# Patient Record
Sex: FEMALE | Race: WHITE | URBAN - METROPOLITAN AREA
[De-identification: names, ages, dates, MRNs, and addresses within clinical notes are randomized per-mention and may not be internally consistent; named-entity substitution may affect disease eponyms.]

---

## 2017-01-11 ENCOUNTER — TRANSFERRED RECORDS (OUTPATIENT)
Dept: HEALTH INFORMATION MANAGEMENT | Facility: CLINIC | Age: 82
End: 2017-01-11

## 2017-01-24 ENCOUNTER — TRANSFERRED RECORDS (OUTPATIENT)
Dept: HEALTH INFORMATION MANAGEMENT | Facility: CLINIC | Age: 82
End: 2017-01-24

## 2017-02-06 ENCOUNTER — OFFICE VISIT (OUTPATIENT)
Dept: NEUROSURGERY | Facility: CLINIC | Age: 82
End: 2017-02-06

## 2017-02-06 VITALS — SYSTOLIC BLOOD PRESSURE: 134 MMHG | HEART RATE: 112 BPM | HEIGHT: 67 IN | DIASTOLIC BLOOD PRESSURE: 89 MMHG

## 2017-02-06 DIAGNOSIS — M54.16 RIGHT LUMBAR RADICULOPATHY: ICD-10-CM

## 2017-02-06 DIAGNOSIS — M43.10 ACQUIRED SPONDYLOLISTHESIS: ICD-10-CM

## 2017-02-06 RX ORDER — FENTANYL 12.5 UG/1
1 PATCH TRANSDERMAL
COMMUNITY

## 2017-02-06 RX ORDER — TIMOLOL MALEATE 2.5 MG/ML
1 SOLUTION/ DROPS OPHTHALMIC 2 TIMES DAILY
COMMUNITY

## 2017-02-06 RX ORDER — HYDROCODONE BITARTRATE AND ACETAMINOPHEN 5; 325 MG/1; MG/1
1 TABLET ORAL EVERY 6 HOURS PRN
COMMUNITY

## 2017-02-06 ASSESSMENT — ENCOUNTER SYMPTOMS
HEMOPTYSIS: 0
SPEECH CHANGE: 0
DIZZINESS: 1
MYALGIAS: 1
HALLUCINATIONS: 0
HEARTBURN: 1
LEG PAIN: 1
TACHYCARDIA: 0
NERVOUS/ANXIOUS: 1
CHILLS: 0
HEMATURIA: 0
SEIZURES: 0
EYE PAIN: 0
SHORTNESS OF BREATH: 1
INSOMNIA: 1
POLYPHAGIA: 0
COUGH: 1
LEG SWELLING: 0
DYSPNEA ON EXERTION: 1
DECREASED CONCENTRATION: 1
BACK PAIN: 1
LIGHT-HEADEDNESS: 0
DOUBLE VISION: 0
NIGHT SWEATS: 0
SLEEP DISTURBANCES DUE TO BREATHING: 1
DECREASED APPETITE: 1
RESPIRATORY PAIN: 0
POSTURAL DYSPNEA: 1
PALPITATIONS: 0
CLAUDICATION: 0
MUSCLE WEAKNESS: 1
ARTHRALGIAS: 1
WEIGHT GAIN: 0
MEMORY LOSS: 0
SPUTUM PRODUCTION: 0
ALTERED TEMPERATURE REGULATION: 1
NAUSEA: 1
NUMBNESS: 1
DEPRESSION: 1
NECK PAIN: 1
BLOATING: 0
WEAKNESS: 1
EYE WATERING: 0
HYPOTENSION: 1
SNORES LOUDLY: 0
TREMORS: 0
COUGH DISTURBING SLEEP: 0
FEVER: 0
JAUNDICE: 0
EXERCISE INTOLERANCE: 1
SYNCOPE: 0
HYPERTENSION: 0
DISTURBANCES IN COORDINATION: 1
JOINT SWELLING: 1
VOMITING: 0
WEIGHT LOSS: 0
MUSCLE CRAMPS: 1
DYSURIA: 0
BOWEL INCONTINENCE: 0
HEADACHES: 0
EYE REDNESS: 0
DIFFICULTY URINATING: 0
RECTAL PAIN: 0
POLYDIPSIA: 0
CONSTIPATION: 0
TINGLING: 0
DIARRHEA: 0
PARALYSIS: 0
FATIGUE: 1
FLANK PAIN: 0
WHEEZING: 0
LOSS OF CONSCIOUSNESS: 0
ABDOMINAL PAIN: 0
INCREASED ENERGY: 1
BLOOD IN STOOL: 0
ORTHOPNEA: 1
RECTAL BLEEDING: 0
PANIC: 1
STIFFNESS: 1

## 2017-02-06 ASSESSMENT — PAIN SCALES - GENERAL: PAINLEVEL: NO PAIN (0)

## 2017-02-06 NOTE — PROGRESS NOTES
Neurosurgery Clinic Consult  Date of Visit: 2/6/2017    REFERRING PHYSICIAN:  Estevan Campbell MD, Family Medicine.      CHIEF COMPLAINT:  Low back pain, right leg pain.      HISTORY OF PRESENT ILLNESS:  Ms. Erickson is a pleasant 81-year-old white female who has been dealing with low back pain for a number of years.  On initial presentation to our clinic today there was a bit of confusion about her records but we located them and the appointment proceeded reasonably well after that, though her son who accompanies her seemed to remain agitated by this.  She's had back pain for years but relates that over the last 6 months she developed an acute right leg pain in conjunction with that.  Initially it started down the outside of the right leg, and lately it has been circumferentially around the leg.  The pain is worse if she is doing anything active, standing or walking.  Sitting helps, lying down seems to help, but she also has pain, sometimes worse at night where she will wake up with a significant right leg pain.  She notes the bottom of her right foot is numb, and it seems as though the right leg is weak.  She thinks the hip gives out, but she is not really sure.  She has had a little trouble with her bladder lately, but she is not sure if that is really related to anything other than the fact that she is having to take medications.  She has seen a number of physicians and has had MRIs of her lumbar spine, her pelvis and hips and has seen an orthopedic surgeon who felt that she did not have much in the way of trouble in the joint itself, but she does have bilateral osteoarthritis, probable trochanteric bursal pain.  She has had physical therapy, a right trochanteric bursal injection, which the patient is quite insistent made no difference, not even in the anesthetic phase.  She had a right L4-5 injection, presumably transforaminal, but I did not see it on the notes specifically how was injected.  She also had a  right L5-S1, again presumably transforaminal injection.  The patient is also insistent that neither of those injections helped, not even for an hour.  This is a bit problematic because these do seem to be pain generators that are identified both on imaging and on exam.      She was also found to have torn labrum bilaterally, significant tendinitis involving gluteus and piriformis bilaterally.  She is not feeling any better with any of the maneuvers that she has undergone and so was referred directly to Dr. Reyes for further evaluation and care.  She is from about 7 hours drive distance, and since she was referred directly to Dr Reyes did not inquire if there were any closer neurosurgical providers closer to her home.      Her  is in the hospital, undergoing cancer treatments, and she is anxious to start feeling better herself so that she can be available for him to help him through his difficulties.      PAST MEDICAL HISTORY, FAMILY HISTORY, SOCIAL HISTORY:  All reviewed in [facility].   13 point ROS:  Reviewed in Epic and negative except for symptoms associated with PMH and HPI.  MEDICATIONS AND ALLERGIES:  Reviewed in Epic.      IMAGING:    Open MRI in [place] on 11/22/2016 performed MRI of the right hip and pelvis revealing bilateral labral tears, bilateral SI joint osteoarthritis, no occult fractures, bilateral gluteal insertion tendinoplasties and right greater than left trochanteric bursitis.    MRI lumbar [facility] in [place], [place] on 12/13/2016 without gadolinium showed multilevel degenerative disk and facet disease, most severe at L4-5 and L5-S1.  She has a listhesis at L4-5, vacuum disk phenomenon at both of those levels, severe loss of disk space height, moderate central canal narrowing at L4-5, severe right and moderate left neural foraminal narrowing at L5-S1.      PHYSICAL EXAMINATION:  Pertinent positives are tenderness over the bilateral SI joints, right much greater than  left; tenderness over the bilateral trochanteric bursa, right much greater than left; 5/5 strength in the extremities with the exception of anterior tibialis, EHL, inversion and eversion of the right foot.     Well developed, well nourished lady found seated comfortably in exam room chair.     She is able to sit and rise independently.  She is accompanied by her son.    *  Mental Status  A&O X3.  Bright, alert, affable, interactive. Language fluid, fund of knowledge intact. Good historian.  Mood and affect congruent and WNL.   *  Cranial Nerves  II: Able to read printed forms, VF full to gross confrontation.  III: IV, VI:  PERRLA, EOMI, No nystagmus, no ptosis.  V: Sensation intact in bilateral V1, V2, and V3. Jaw clench symmetric.    VII:  Intact to voice bilaterally.  IX:  Pushes tongue against bilateral cheeks.  X:  Palate elevates, uvula midline, phonation intact.  XI: Elevates shoulders, head turn intact.  XII: Tongue midline. No fasciculations.  *   Finger nose accurate. No drift.  *  Cervical Spine:  DTR's at Biceps, Triceps, and Brachioradialis 2/4 and symmetric.  Sensation intact.    No Fernández's. No Phalen's.  No Tinel's. No fasciculations.   Muscle bulk and tone WNL.    *  Lumbar Spine:    DTR's Patellar and Achilles 1/4 and symmetric.   No fasciculations.  Muscle bulk and tone WNL.  No Clonus.  Sensation intact.    Lower Extremity Strength                   RIGHT                   LEFT     Iliopsoas                    5/5                      5/5       Quad                    5/5                        5/5       Hamstring                      5/5                        5/5         Gastrocs                    5/5                        5/5       Tib. Anterior                     4/5                        5/5       EHL                      4/5                        5/5       Babinski                 Down                                      Down                     *  Structural Exam  Inspection of the  spine reveals no scoliosis. Head is forward of the pelvis in the sagittal plane.     Lumbar ROM tight. No spasming, no tenderness, no step offs. No SLR.  .  Feet are warm.  Gait narrow, no scissoring.  Uses a walker.   right antalgia.      ASSESSMENT:  The primary encounter diagnosis was Lumbago. Diagnoses of Acquired spondylolisthesis and Right lumbar radiculopathy were also pertinent to this visit.     IMPRESSION/PLAN  Ms. Erickson almost certainly has a lumbar radiculopathy, probably not spinal stenosis symptoms so much, but is coupled with degenerative joint disease at a number of other locations and so finding solution for her pain is going to be a bit of a challenge.      I do feel that she might be moving on dynamic images, so we will get some flexion/extension views.   We will get a CT scan of the lumbar spine to better delineate the bony anatomy and I urged her that she should continue on with meeting with her pain management doctor, regular injections, in an attempt to alleviate the sources of pain that are outside of the lumbar spine.  Essentially the idea is to pick away at each pain generator in a series so that overall she will start to feel better. However, given that, I have not lost sight of the fact that her right ankle is weak, and that is almost certainly because of the stenosis we see on the imaging.      Once I have the new images, I will review them with Dr. Reyes and determine if he would see her initially or refer her to one of our complex spine specialist here.  Because she lives so far away, we want to make sure that she only has come once more for evaluation and then a surgical decision.    While she is happy to be here today and to be seen, she is also interested perhaps in pursuing a neurosurgical evaluation significantly closer to home and would consider [place] as a reasonable location to get care.      Certainly, we will be more than happy to do whatever is in the best interest of this  very nice lady.      We appreciate the opportunity to participate in the care of this pleasant patient.  We appreciate your confidence in referring her to the [facility] Department of Neurosurgery.      Best Regards,    Rosa Todd PA-C  [facility]  Department of Neurosurgery  Phone: [phone]  Fax: [phone]        Total time: 60 minutes with more than 30 minutes spent in direct face to face contact reviewing films, providing education, counseling, the importance of good health habits, non-operative therapies,and indications for surgery as well as further follow up.

## 2017-02-06 NOTE — Clinical Note
2/6/2017       RE: Nadege Boothe [place]  [address] [address]     Dear Colleague,    Thank you for referring your patient, Nadege Erickson, to the [facility] NEUROSURGERY at [facility]. Please see a copy of my visit note below.        DRAFT   dictated    Neurosurgery Clinic Consult  Date of Visit: 2/6/2017    Referring Provider:  Referred for:     Dear      We were happy to see Nadege Erickson , a pleasant 81 year old year old * handed female for    HPI: His/Her history begins .    Treatment history:  SURGERIES:   PT:   TRACTION:   ORAL STEROIDS:   CHIROPRACTIC:  INJECTIONS:   CHEMO:   XRT:   PERTINENT MEDS:   NICOTINE:   PAIN CONTRACT:     Symptom description:  Reviewed in Epic;  Patient Supplied Answers To the [facility] Pain Questionnaire  [facility] Pain -  Patient Entered Questionnaire/Answers 2/6/2017   What number best describes your pain right now:  0 = No pain  to  10 = Worst pain imaginable 3   How would you describe the pain? sharp   Which of the following worsen your pain? standing, walking, exercise   Which of the following improve or reduce your pain?  sitting, medication   What number best describes your average pain for the past week:  0 = No pain  to  10 = Worst pain imaginable 7   What number best describes your LOWEST pain in past 24 hours:  0 = No pain  to  10 = Worst pain imaginable 2   What number best describes your WORST pain in past 24 hours:  0 = No pain  to  10 = Worst pain imaginable 10   When is your pain worst? Night   What non-medicine treatments have you already had for your pain? pain clinic, physical therapy, spine injections (shots)   Have you tried treating your pain with medication?  Yes   Are you currently taking medications for your pain? Yes         Other Symptoms:  *  NUMBNESS:           WHERE:          QUALITY:    Burning, tingling, cold, crawling, hypersensitive, pins and needles.    *  WEAKNESS:    *  BOWEL/BLADDER FUNCTION:  Intact.     *  OTHER SYMPTOMS:  Coordination, Balance, Vision, Hearing, Scent, Speech.    SHe presents for evaluation, and treatment recommendations with the understanding that we are a surgical team.      Current outpatient prescriptions:      HYDROcodone-acetaminophen (NORCO) 5-325 MG per tablet, Take 1 tablet by mouth every 6 hours as needed for moderate to severe pain, Disp: , Rfl:      multivitamin (OCUVITE) TABS tablet, Take 1 tablet by mouth daily, Disp: , Rfl:      Zolpidem Tartrate (AMBIEN PO), Take 5 mg by mouth, Disp: , Rfl:      bimatoprost (LUMIGAN) 0.01 % SOLN, 1 drop At Bedtime, Disp: , Rfl:      fentaNYL (DURAGESIC) 12 mcg/hr 72 hr patch, Place 1 patch onto the skin every 72 hours, Disp: , Rfl:      ALPRAZolam (XANAX PO), Take 0.25 mg by mouth 3 times daily as needed for anxiety, Disp: , Rfl:      Citalopram Hydrobromide (CELEXA PO), Take 20 mg by mouth, Disp: , Rfl:      Atorvastatin Calcium (LIPITOR PO), Take 20 mg by mouth, Disp: , Rfl:      timolol (TIMOPTIC) 0.25 % ophthalmic solution, 1 drop 2 times daily, Disp: , Rfl:      calcium-vitamin D (CALTRATE) 600-400 MG-UNIT per tablet, Take 1 tablet by mouth 2 times daily, Disp: , Rfl:      NAPROXEN PO, Take 500 mg by mouth 2 times daily as needed for moderate pain, Disp: , Rfl:      RaNITidine HCl (ZANTAC PO), Take 150 mg by mouth 2 times daily, Disp: , Rfl:     Allergies   Allergen Reactions     Dorzolamide      Eye redness , burning       History reviewed. No pertinent past medical history.    History reviewed. No pertinent past surgical history.    History reviewed. No pertinent family history.    Social History     Social History     Marital Status:      Spouse Name: N/A     Number of Children: N/A     Years of Education: N/A     Occupational History     Not on file.     Social History Main Topics     Smoking status: Never Smoker      Smokeless tobacco: Not on file     Alcohol Use: Not on file     Drug Use: Not on file     Sexual Activity: Not on  "file     Other Topics Concern     Not on file     Social History Narrative     No narrative on file     Problem list and 13 point review of systems: is reviewed in Epic and is negative with the exception of those symptoms associated with HPI and PMH.      OBJECTIVE:   /89 mmHg  Pulse 112  Ht 1.702 m (5' 7\")    Imaging:  These are the pertinent radiologist's findings from:  MRI CT Xray's W/WO @ [facility] .   See the full report in EPIC/Media tab.  I personally reviewed the images with the patient.      Exam:  *   Well developed, well nourished * found seated comfortably in exam room chair.  SHe is able to sit and rise independently.  SHe is accompanied by *.    *  Mental Status  A&O X3.  Bright, alert, affable, interactive. Language fluid, fund of knowledge intact. Good historian.  Mood and affect congruent and WNL.  Able to recall 3/3 objects.   Able to name 3/3 objects.  Able to repeat \"No ifs, ands, or buts.\"   Interprets aphorisms concretely/abstractly.  Able to draw a clock.  Performs serial 7's for 5 computations. (100/93/86/79/71/64).   *  Cranial Nerves  II: Able to read printed forms, VF full to gross confrontation.  III: IV, VI:  PERRLA, EOMI, No nystagmus, no ptosis.  V: Sensation intact in bilateral V1, V2, and V3. Jaw clench symmetric.    VII:  Intact to voice bilaterally.  IX:  Pushes tongue against bilateral cheeks.  X:  Palate elevates, uvula midline, phonation intact.  XI: Elevates shoulders, head turn intact.  XII: Tongue midline. No fasciculations.  *  Cerebellar:  Finger nose accurate. Heel shin intact.   Rapid alternating movements smooth.  *  Soolmon-cranial:    No drift.  *  Cervical Spine:  DTR's at Biceps, Triceps, and Brachioradialis 2/4 and symmetric.  Sensation intact.    No Fernández's. No Phalen's.  No Tinel's. No fasciculations.   Muscle bulk and tone WNL.  Upper Extremity Strength.              RIGHT                LEFT     Deltoid              5/5                   5/5       Biceps      "         5/5                   5/5        Triceps              5/5                   5/5       Wrist Extensor              5/5                   5/5                     5/5                    5/5       Interossei              5/5                   5/5       EPL              5/5                    5/5       Pinch              5/5 5/5            *  Lumbar Spine:    Able to heel and toe stand/walk.   DTR's Patellar and Achilles 1/4 and symmetric.   No fasciculations.  Muscle bulk and tone WNL.  No Clonus.  Sensation intact.    Lower Extremity Strength                   RIGHT                   LEFT     Iliopsoas                    5/5 5/5       Quad                    5/5 5/5       Hamstring                      5/5 5/5         Gastrocs                    5/5 5/5       Tib. Anterior                     5/5 5/5       EHL                      5/5 5/5       Babinski                 Down                                      Down                     *  Structural Exam  Inspection of the spine reveals no scoliosis, exaggerated kyphosis or lordosis. Prior surgical scars are well healed. Head is balanced over the pelvis in the coronal and sagittal plane.    Cervical spine ROM full. No spasming. No tenderness to palpation.  No Spurling's or L'Hermitte's.   Lumbar ROM full.  Able to doff and don socks without difficulty. No spasming, no tenderness, no step offs. No SLR.  No SI tenderness. No trochanteric bursal tenderness. Negative Paul's. Negative Lowell's.  Feet are warm, intact dorsalis pedis pulses, pink, brisk capillary refill.  Gait narrow, smooth, no scissoring. No antalgia. Tandem gait intact.  Negative Station and Romberg.   *  Sensory level intact.   *  Katherine's 5/5 are negative.   (Katherine's are:   non anatomic tenderness;   pain with simulated exam, trunk  rotation or axial load;    positive distraction tests e.g.. SLR;    regional disturbances, non anatomic pain, numbness, weakness;  overreaction to testing.)    ASSESSMENT:  The primary encounter diagnosis was Lumbago. Diagnoses of Acquired spondylolisthesis and Right lumbar radiculopathy were also pertinent to this visit.    PLAN:  Overview  We discussed medication.    *  FYI: In general we prescribe narcotics for pain management in the post operative recovery phase generally 2-6 weeks post op, depending on the surgery performed.  Pre-op our patients are advised that by 6 weeks post op we anticipate they will no longer require narcotic.  In some circumstances we extend the treatment window to up to 12 weeks post-op with the understanding that after 6 weeks they have a choice to either:  Have us devise a weaning schedule, goal to be no narcotic by the 12th week post op:  Or we make a referral to pain management or PCP to take over narcotic prescriptions.   For pain outside the scope of these parameters we refer the patient back to his/her other providers for ongoing narcotic needs.   Since    *  his/her pain is chronic   *  s-he is not a candidate for surgery at this juncture  *  is present beyond the time frame of expected post surgical pain we will defer to his/her other providers.  *  Medications prescribed today:    E-prescribed * Printed today and handed to the patient.  *  Continue to avoid NSAIDs until 3 months post op.  We discussed follow up.     *  All the patient's questions have been answered and they demonstrate good understanding of the above.   *  We will see * again in * weeks with new * at that time.    *  We are comfortable releasing * to PRN follow up.  We have not made a specific return appointment but will be happy to see * again should the need arise.    *  Levdolly Shyann Glouster has our contact information and is aware that s-he should call if s-he has questions comments or concerns.   We appreciate  the opportunity to be of service in the care of this pleasant patient  Please do call if there is anything more we can do..    Best Regards,    Rosa Todd PA-C  [facility]  Department of Neurosurgery  Phone: [phone]  Fax: [phone]        Total time: 60 minutes with more than 30 minutes spent in direct face to face contact reviewing films, providing education, counseling, the importance of good health habits, non-operative therapies,and indications for surgery as well as further follow up.

## 2017-02-07 LAB — RADIOLOGIST FLAGS: NORMAL

## 2017-07-06 NOTE — PROGRESS NOTES
February 13, 2017            [place] [address]      RE: Nadege [place]       Dear Dr. Campbell:      We saw Mrs. Erickson on 02/07/2017 for low back pain and right leg pain.  As you may recall from the note I sent to you last week, we thought that she probably had a combination of lumbar spondylosis, creating the back pain, as well as the leg pain, but there was possibly some mechanical instability as well.  We were unable to appreciate any mechanical instability, although she does have fairly severe spondylotic disease.  I reviewed the films with Dr. Burke Reyes, reviewed the case with him, and he feels that she has a number of options open to her regarding treatment.  The first option would be certainly that one of our colleagues, Dr. Escalante or Dr. Heard, would be an excellent choice for ongoing management, surgical or otherwise, of this problem.  Also, there are a very fine neurosurgery group in [place], [place], working out of the [facility] that could certainly take this on, or even apparently the patient tells me today there is a new physician in [place] who is a neurosurgeon and with whom she is interested in having an appointment.      I discussed this all at length with Mr. Erickson by phone today.  He has his own health problems to deal with, as you know, and so while he is going in for surgery tomorrow, he is hopeful that Mrs. Erickson can find someone fairly convenient to them who can help her get some of this pain under control, at least in the interim until he feels well enough to be able to get around a little bit better and help her.      It has been our pleasure looking after this very nice lady, and we stand ready to assist in any way that we can.  We have left the decision where to obtain care in the hands of  and Mrs. Erickson, but again please know we certainly appreciate your confidence in referring her to the  [facility] Department of Neurosurgery.         MITCH GO PA-C             D: 2017 13:32   T: 2017 11:11   MT: FER      Name:     JANUARY TREVINO   MRN:      [id number]        Account:      [id number]   :      1935           Service Date: 2017      Document: [id number]       no

## 2020-03-10 ENCOUNTER — HEALTH MAINTENANCE LETTER (OUTPATIENT)
Age: 85
End: 2020-03-10

## 2020-12-27 ENCOUNTER — HEALTH MAINTENANCE LETTER (OUTPATIENT)
Age: 85
End: 2020-12-27

## 2021-04-24 ENCOUNTER — HEALTH MAINTENANCE LETTER (OUTPATIENT)
Age: 86
End: 2021-04-24

## 2021-10-09 ENCOUNTER — HEALTH MAINTENANCE LETTER (OUTPATIENT)
Age: 86
End: 2021-10-09

## 2022-05-16 ENCOUNTER — HEALTH MAINTENANCE LETTER (OUTPATIENT)
Age: 87
End: 2022-05-16

## 2022-09-11 ENCOUNTER — HEALTH MAINTENANCE LETTER (OUTPATIENT)
Age: 87
End: 2022-09-11

## 2023-06-03 ENCOUNTER — HEALTH MAINTENANCE LETTER (OUTPATIENT)
Age: 88
End: 2023-06-03